# Patient Record
Sex: FEMALE | Race: BLACK OR AFRICAN AMERICAN | NOT HISPANIC OR LATINO | URBAN - METROPOLITAN AREA
[De-identification: names, ages, dates, MRNs, and addresses within clinical notes are randomized per-mention and may not be internally consistent; named-entity substitution may affect disease eponyms.]

---

## 2021-09-03 ENCOUNTER — EMERGENCY (EMERGENCY)
Facility: HOSPITAL | Age: 56
LOS: 0 days | Discharge: HOME | End: 2021-09-03
Attending: STUDENT IN AN ORGANIZED HEALTH CARE EDUCATION/TRAINING PROGRAM
Payer: COMMERCIAL

## 2021-09-03 VITALS — SYSTOLIC BLOOD PRESSURE: 138 MMHG | HEART RATE: 72 BPM | DIASTOLIC BLOOD PRESSURE: 79 MMHG

## 2021-09-03 VITALS
WEIGHT: 164.91 LBS | HEART RATE: 107 BPM | SYSTOLIC BLOOD PRESSURE: 147 MMHG | RESPIRATION RATE: 18 BRPM | DIASTOLIC BLOOD PRESSURE: 100 MMHG | TEMPERATURE: 99 F | HEIGHT: 62 IN

## 2021-09-03 DIAGNOSIS — K64.9 UNSPECIFIED HEMORRHOIDS: ICD-10-CM

## 2021-09-03 DIAGNOSIS — I10 ESSENTIAL (PRIMARY) HYPERTENSION: ICD-10-CM

## 2021-09-03 DIAGNOSIS — K62.5 HEMORRHAGE OF ANUS AND RECTUM: ICD-10-CM

## 2021-09-03 PROCEDURE — 99282 EMERGENCY DEPT VISIT SF MDM: CPT

## 2021-09-03 NOTE — ED PROVIDER NOTE - GASTROINTESTINAL, MLM
Abdomen soft, non-tender, no guarding. external/internal hemorrhoids on OLE, one mild bleeding external hemorrhoid, no thrombosed hemorrhoids, no melena, BRBPR

## 2021-09-03 NOTE — ED PROVIDER NOTE - OBJECTIVE STATEMENT
55 yo female with pmhx htn presenting for rectal bleeding today. Patient states she has been straining during defecation for 3 days, last BM 2 days ago. today attempted to have BM at work, noted to have bright red blood on toilet paper, that was persistent, came to ED. States this is first occurrence.    Denies CP, SOB, LOC, N/V, fevers.

## 2021-09-03 NOTE — ED ADULT NURSE NOTE - OBJECTIVE STATEMENT
Pt c/o rectal bleeding after using restroom today at work. Admits to straining to have BM 2 days ago. Pt tearful, verbalizes fear of hospitals. Endorses bright red blood per rectum after several wipes with tissue. Denies lightheadedness, black tarry stools, abdominal pain or history of PUD or ETOH.

## 2021-09-03 NOTE — ED PROVIDER NOTE - CLINICAL SUMMARY MEDICAL DECISION MAKING FREE TEXT BOX
55 yo female with pmhx htn p/w rectal bleeding. Consistent with hemorrhoids/LGIB. currently HD and clinically stable, no thrombosed hemorrhoids on exam. will dc with supportive care, prep H, PMD f/u with return precautions.

## 2021-09-03 NOTE — ED PROVIDER NOTE - NSFOLLOWUPINSTRUCTIONS_ED_ALL_ED_FT
Activities as tolerated. Please encourage good oral and fluid intake.    For hemorrhoids, please use sitz bath, or Preparation H over the counter as needed.    Please see your primary care doctor within 24-48 hours for further management of your symptoms.    Please seek emergent medical management if you have any worsening signs or symptoms, such as worsening rectal bleeding, abdominal pain, persistent vomiting, loss of consciousness.

## 2021-09-03 NOTE — ED PROVIDER NOTE - CONSTITUTIONAL, MLM
anxious appearing, awake, alert, oriented to person, place, time/situation and in no apparent distress. normal...

## 2021-09-03 NOTE — ED PROVIDER NOTE - PATIENT PORTAL LINK FT
You can access the FollowMyHealth Patient Portal offered by [facility] by registering at the following website: [email or web address]. By joining [employer]’s FollowMyHealth portal, you will also be able to view your health information using other applications (apps) compatible with our system.